# Patient Record
Sex: FEMALE | Race: WHITE | NOT HISPANIC OR LATINO | Employment: OTHER | URBAN - METROPOLITAN AREA
[De-identification: names, ages, dates, MRNs, and addresses within clinical notes are randomized per-mention and may not be internally consistent; named-entity substitution may affect disease eponyms.]

---

## 2017-03-04 DIAGNOSIS — C50.112 MALIGNANT NEOPLASM OF CENTRAL PORTION OF LEFT FEMALE BREAST: ICD-10-CM

## 2017-03-06 RX ORDER — ANASTROZOLE 1 MG/1
TABLET ORAL
Qty: 90 TABLET | Refills: 3 | Status: SHIPPED | OUTPATIENT
Start: 2017-03-06 | End: 2018-03-20 | Stop reason: SDUPTHER

## 2017-03-22 ENCOUNTER — OFFICE VISIT (OUTPATIENT)
Dept: HEMATOLOGY/ONCOLOGY | Facility: CLINIC | Age: 82
End: 2017-03-22
Payer: MEDICARE

## 2017-03-22 VITALS
TEMPERATURE: 98 F | DIASTOLIC BLOOD PRESSURE: 79 MMHG | SYSTOLIC BLOOD PRESSURE: 169 MMHG | BODY MASS INDEX: 23.13 KG/M2 | RESPIRATION RATE: 15 BRPM | HEART RATE: 103 BPM | WEIGHT: 143.31 LBS

## 2017-03-22 DIAGNOSIS — C50.912 CANCER OF LEFT BREAST, STAGE 3: Primary | ICD-10-CM

## 2017-03-22 PROCEDURE — 99999 PR PBB SHADOW E&M-EST. PATIENT-LVL III: CPT | Mod: PBBFAC,,, | Performed by: INTERNAL MEDICINE

## 2017-03-22 PROCEDURE — 1157F ADVNC CARE PLAN IN RCRD: CPT | Mod: S$GLB,,, | Performed by: INTERNAL MEDICINE

## 2017-03-22 PROCEDURE — 3078F DIAST BP <80 MM HG: CPT | Mod: S$GLB,,, | Performed by: INTERNAL MEDICINE

## 2017-03-22 PROCEDURE — 1160F RVW MEDS BY RX/DR IN RCRD: CPT | Mod: S$GLB,,, | Performed by: INTERNAL MEDICINE

## 2017-03-22 PROCEDURE — 1126F AMNT PAIN NOTED NONE PRSNT: CPT | Mod: S$GLB,,, | Performed by: INTERNAL MEDICINE

## 2017-03-22 PROCEDURE — 3077F SYST BP >= 140 MM HG: CPT | Mod: S$GLB,,, | Performed by: INTERNAL MEDICINE

## 2017-03-22 PROCEDURE — 1159F MED LIST DOCD IN RCRD: CPT | Mod: S$GLB,,, | Performed by: INTERNAL MEDICINE

## 2017-03-22 PROCEDURE — 99213 OFFICE O/P EST LOW 20 MIN: CPT | Mod: S$GLB,,, | Performed by: INTERNAL MEDICINE

## 2017-03-22 RX ORDER — ACETAMINOPHEN 500 MG/1
CAPSULE, LIQUID FILLED ORAL
COMMUNITY
Start: 2017-02-07 | End: 2017-03-22 | Stop reason: SDUPTHER

## 2017-03-22 NOTE — PROGRESS NOTES
Subjective:       Patient ID: Lorene Cartwright is a 81 y.o. female.    Chief Complaint: Breast Cancer    HPI Ms. 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  Today she reports no new issues.  Her arthritic symptoms are unchanged.  Appetites been stable.  She does have some chronic constipation.  She has no shortness of breath.      Past history:She had noted thickening of her breast for several months in early 2013 and saw her primary physician who ordered the screening mammography.  This showed a 7 mm area mass density in the peter-areolar region.  Ultrasonography showed a 4 cm area of the hypoechogenicity. Core needle biopsy showed infiltrating lobular carcinoma which was ER positive, [place] negative, and HER-2 negative by FISH.    Physical examination at that time showed a 5 x 4.5 cm mass in the lateral aspect of left breast with palpable left axillary lymph nodes.  MRI on March 13 showed a 6.8 cm mass involving the upper outer quadrant of her quadrant several abnormal lymph nodes in the left axilla.    On August 19, 2013 she underwent left mastectomy and sentinel lymph node biopsy then a axillary lymph node dissection.  Mastectomy showed a pleomorphic infiltrating lobular carcinoma-high grade (3+3+2) which was multicentric, with multiple tumors ranging from  6.4 cm to 0.1  centimeters and involving all 4 quadrants of the breast.  [place] lymph node was positive with extracapsular extension and 22 other axillary lymph nodes also were positive.  Additional extracapsular was extension was noted in that sample. Ostoperatively, she received 4 cycles of AC chemotherapy.  and  Taxol  X  4.  She also received adjuvant radiation which was completed that at the end of March 2014. .Anastrozole therapy was started in January 2014.  Review of Systems   Constitutional: Negative for activity change, appetite change, fever and  unexpected weight change.   Respiratory: Negative for cough and shortness of breath.    Cardiovascular: Negative for chest pain.   Gastrointestinal: Negative for abdominal pain, diarrhea and nausea.   Musculoskeletal: Positive for arthralgias (knees). Negative for back pain.        Occasional right shoulder pain   Neurological: Negative for headaches.        Memory loss   Psychiatric/Behavioral: Negative for dysphoric mood. The patient is not nervous/anxious.        Objective:      Physical Exam   Constitutional: She is oriented to person, place, and time. She appears well-developed and well-nourished. No distress.   Cardiovascular: Normal rate, regular rhythm and normal heart sounds.    Pulmonary/Chest: Effort normal and breath sounds normal. She has no wheezes. She has no rales. Right breast exhibits no mass, no nipple discharge and no skin change.       Abdominal: Soft. She exhibits no mass. There is no tenderness.   Lymphadenopathy:     She has no cervical adenopathy.     She has no axillary adenopathy.        Right: No supraclavicular adenopathy present.        Left: No supraclavicular adenopathy present.   Neurological: She is alert and oriented to person, place, and time.   Psychiatric: She has a normal mood and affect. Her behavior is normal. Thought content normal.       Assessment:      1. Cancer of left breast, stage 3        Plan:     return to clinic in 4 months.  Mammogram at that time.

## 2017-07-03 ENCOUNTER — OFFICE VISIT (OUTPATIENT)
Dept: HEMATOLOGY/ONCOLOGY | Facility: CLINIC | Age: 82
End: 2017-07-03
Payer: MEDICARE

## 2017-07-03 ENCOUNTER — HOSPITAL ENCOUNTER (OUTPATIENT)
Dept: RADIOLOGY | Facility: HOSPITAL | Age: 82
Discharge: HOME | End: 2017-07-03
Attending: INTERNAL MEDICINE
Payer: MEDICARE

## 2017-07-03 VITALS — HEIGHT: 66 IN | BODY MASS INDEX: 22.5 KG/M2 | WEIGHT: 140 LBS

## 2017-07-03 VITALS
HEIGHT: 65 IN | HEART RATE: 72 BPM | DIASTOLIC BLOOD PRESSURE: 71 MMHG | OXYGEN SATURATION: 98 % | TEMPERATURE: 97 F | WEIGHT: 145 LBS | RESPIRATION RATE: 18 BRPM | SYSTOLIC BLOOD PRESSURE: 165 MMHG | BODY MASS INDEX: 24.16 KG/M2

## 2017-07-03 DIAGNOSIS — C50.112 MALIGNANT NEOPLASM OF CENTRAL PORTION OF LEFT FEMALE BREAST: Primary | ICD-10-CM

## 2017-07-03 DIAGNOSIS — C50.912 CANCER OF LEFT BREAST, STAGE 3: ICD-10-CM

## 2017-07-03 DIAGNOSIS — M89.9 DISORDER OF BONE: ICD-10-CM

## 2017-07-03 DIAGNOSIS — Z13.820 OSTEOPOROSIS SCREENING: ICD-10-CM

## 2017-07-03 PROCEDURE — 77061 BREAST TOMOSYNTHESIS UNI: CPT | Mod: 26,,, | Performed by: RADIOLOGY

## 2017-07-03 PROCEDURE — 99213 OFFICE O/P EST LOW 20 MIN: CPT | Mod: S$GLB,,, | Performed by: PHYSICIAN ASSISTANT

## 2017-07-03 PROCEDURE — 99999 PR PBB SHADOW E&M-EST. PATIENT-LVL III: CPT | Mod: PBBFAC,,, | Performed by: PHYSICIAN ASSISTANT

## 2017-07-03 PROCEDURE — 1157F ADVNC CARE PLAN IN RCRD: CPT | Mod: S$GLB,,, | Performed by: PHYSICIAN ASSISTANT

## 2017-07-03 PROCEDURE — 1159F MED LIST DOCD IN RCRD: CPT | Mod: S$GLB,,, | Performed by: PHYSICIAN ASSISTANT

## 2017-07-03 PROCEDURE — 77065 DX MAMMO INCL CAD UNI: CPT | Mod: 26,,, | Performed by: RADIOLOGY

## 2017-07-03 PROCEDURE — 1126F AMNT PAIN NOTED NONE PRSNT: CPT | Mod: S$GLB,,, | Performed by: PHYSICIAN ASSISTANT

## 2017-07-03 NOTE — PROGRESS NOTES
Subjective:       Patient ID: Lorene Cartwright is a 81 y.o. female.    Chief Complaint: No chief complaint on file.    Ms. 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  Today she reports no new issues.  Her arthritic symptoms are unchanged.  Appetite and energy level has been stable.    No breast pain. No shortness of breath. She has recently noticed some intermittent swelling at her right ankle.     Right mammogram from today:  Impression:  No mammographic evidence of malignancy.  BI-RADS Category 1: Negative  Recommendation:  Routine Screening Mammogram in 1 year is recommended for the right breast.        Past history:She had noted thickening of her breast for several months in early 2013 and saw her primary physician who ordered the screening mammography.  This showed a 7 mm area mass density in the peter-areolar region.  Ultrasonography showed a 4 cm area of the hypoechogenicity. Core needle biopsy showed infiltrating lobular carcinoma which was ER positive, [place] negative, and HER-2 negative by FISH.    Physical examination at that time showed a 5 x 4.5 cm mass in the lateral aspect of left breast with palpable left axillary lymph nodes.  MRI on March 13 showed a 6.8 cm mass involving the upper outer quadrant of her quadrant several abnormal lymph nodes in the left axilla.    On August 19, 2013 she underwent left mastectomy and sentinel lymph node biopsy then a axillary lymph node dissection.  Mastectomy showed a pleomorphic infiltrating lobular carcinoma-high grade (3+3+2) which was multicentric, with multiple tumors ranging from  6.4 cm to 0.1  centimeters and involving all 4 quadrants of the breast.  [place] lymph node was positive with extracapsular extension and 22 other axillary lymph nodes also were positive.  Additional extracapsular was extension was noted in that sample. Ostoperatively, she received 4  cycles of AC chemotherapy.  and  Taxol  X  4.  She also received adjuvant radiation which was completed that at the end of March 2014. .Anastrozole therapy was started in January 2014.      Review of Systems   Constitutional: Negative for activity change, appetite change, chills, diaphoresis, fatigue, fever and unexpected weight change.   HENT: Negative for congestion, ear pain, mouth sores, rhinorrhea, sinus pressure, sore throat and trouble swallowing.    Eyes: Negative for visual disturbance.   Respiratory: Negative for cough, chest tightness and shortness of breath.    Cardiovascular: Negative for chest pain and leg swelling.   Gastrointestinal: Negative for abdominal pain, blood in stool, constipation, diarrhea, nausea and vomiting.   Genitourinary: Negative for dysuria and hematuria.   Musculoskeletal: Positive for arthralgias. Negative for back pain, gait problem, joint swelling and myalgias.   Skin: Negative for pallor and rash.   Neurological: Negative for dizziness, weakness, light-headedness and headaches.   Hematological: Negative for adenopathy. Does not bruise/bleed easily.   Psychiatric/Behavioral: Negative for confusion, dysphoric mood, sleep disturbance and suicidal ideas.       Objective:      Physical Exam   Constitutional: She is oriented to person, place, and time. She appears well-developed and well-nourished. No distress.   Presents with daughter, ECOG 0   HENT:   Head: Normocephalic.   Mouth/Throat: No oropharyngeal exudate.   Eyes: Conjunctivae and EOM are normal. Pupils are equal, round, and reactive to light. No scleral icterus.   Neck: Normal range of motion. Neck supple. No thyromegaly present.   Cardiovascular: Normal rate, regular rhythm, normal heart sounds and intact distal pulses.    No murmur heard.  Pulmonary/Chest: Effort normal and breath sounds normal. No respiratory distress. She has no wheezes.   RIght breast without mass or nodules. S/p left mastectomy without mass or  nodules at chest wall. No axillary adenopathy.    Abdominal: Soft. Bowel sounds are normal. She exhibits no distension and no mass. There is no tenderness.   Musculoskeletal: Normal range of motion. She exhibits no edema.   No spinal or paraspinal tenderness to palpation         Lymphadenopathy:     She has no cervical adenopathy.   Neurological: She is alert and oriented to person, place, and time. No cranial nerve deficit.   CN II-XII intact  No focal neurologic deficits     Skin: Skin is warm and dry. No rash noted. No erythema. No pallor.   Psychiatric: She has a normal mood and affect. Her behavior is normal. Judgment and thought content normal.   Vitals reviewed.      Assessment:       1. Malignant neoplasm of central portion of left female breast        Plan:       Patient will continue Letrozole and return to clinic in 4 months.  Referral for bone density placed as she is on Aromatase Inhibitor therapy and patient will remain on Vitamin D/Calcium.

## 2017-08-18 ENCOUNTER — HOSPITAL ENCOUNTER (OUTPATIENT)
Dept: RADIOLOGY | Facility: HOSPITAL | Age: 82
Discharge: HOME | End: 2017-08-18
Attending: NURSE PRACTITIONER
Payer: MEDICARE

## 2017-08-18 ENCOUNTER — HOSPITAL ENCOUNTER (OUTPATIENT)
Dept: RADIOLOGY | Facility: CLINIC | Age: 82
Discharge: HOME | End: 2017-08-18
Attending: INTERNAL MEDICINE
Payer: MEDICARE

## 2017-08-18 DIAGNOSIS — Z13.820 OSTEOPOROSIS SCREENING: ICD-10-CM

## 2017-08-18 DIAGNOSIS — M89.9 DISORDER OF BONE: ICD-10-CM

## 2017-08-18 DIAGNOSIS — M79.672 LEFT FOOT PAIN: ICD-10-CM

## 2017-08-18 PROCEDURE — 73610 X-RAY EXAM OF ANKLE: CPT | Mod: 26,LT,, | Performed by: RADIOLOGY

## 2017-08-18 PROCEDURE — 73090 X-RAY EXAM OF FOREARM: CPT | Mod: 26,LT,, | Performed by: RADIOLOGY

## 2017-08-18 PROCEDURE — 77080 DXA BONE DENSITY AXIAL: CPT | Mod: 26,,, | Performed by: INTERNAL MEDICINE

## 2017-08-18 PROCEDURE — 73610 X-RAY EXAM OF ANKLE: CPT | Mod: TC,LT

## 2017-08-18 PROCEDURE — 77080 DXA BONE DENSITY AXIAL: CPT | Mod: TC

## 2017-08-18 PROCEDURE — 73090 X-RAY EXAM OF FOREARM: CPT | Mod: TC,LT

## 2017-11-06 NOTE — PROGRESS NOTES
Subjective:       Patient ID: Lorene Cartwright is a 82 y.o. female.    Chief Complaint: No chief complaint on file.    HPI Ms. 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  She has been on anastrozole therapy.  Today she reports she's had some occasional pain in her left buttock which is positional.  She has no shortness of breath.  Appetites been good.  Her constipation is controlled after taking some MiraLAX.       Past history:She had noted thickening of her breast for several months in early 2013 and saw her primary physician who ordered the screening mammography.  This showed a 7 mm area mass density in the peter-areolar region.  Ultrasonography showed a 4 cm area of the hypoechogenicity. Core needle biopsy showed infiltrating lobular carcinoma which was ER positive, [place] negative, and HER-2 negative by FISH.      Clinically the tumor was T3 N1.    On August 19, 2013 she underwent left mastectomy and sentinel lymph node biopsy then a axillary lymph node dissection.  Mastectomy showed a pleomorphic infiltrating lobular carcinoma-high grade (3+3+2) which was multicentric, with multiple tumors ranging from  6.4 cm to 0.1  centimeters and involving all 4 quadrants of the breast.  [place] lymph node was positive with extracapsular extension and 22 other axillary lymph nodes also were positive.  Additional extracapsular was extension was noted in that sample. Ostoperatively, she received 4 cycles of AC chemotherapy.  and  Taxol  X  4.      She also received adjuvant radiation which was completed that at the end of March 2014.     Anastrozole therapy was started in January 2014  Review of Systems   Constitutional: Negative for appetite change and unexpected weight change.   Eyes: Negative for visual disturbance.   Respiratory: Negative for cough and shortness of breath.    Cardiovascular: Negative for chest pain.    Gastrointestinal: Negative for abdominal pain and diarrhea.   Genitourinary: Negative for frequency.   Musculoskeletal: Negative for back pain.   Skin: Negative for rash.   Neurological: Negative for headaches.   Hematological: Negative for adenopathy.   Psychiatric/Behavioral: The patient is not nervous/anxious.        Objective:      Physical Exam   Constitutional: She appears well-developed and well-nourished. No distress.   Eyes: No scleral icterus.   Pulmonary/Chest: Effort normal and breath sounds normal. She has no wheezes. She has no rales.   Abdominal: Soft. She exhibits no mass. There is no tenderness.   Lymphadenopathy:     She has no cervical adenopathy.   Psychiatric: She has a normal mood and affect. Her behavior is normal. Thought content normal.   Vitals reviewed.      Assessment:       1. Malignant neoplasm of central portion of right breast in female, estrogen receptor positive        Plan:       RTC 6 Months    And a wheelchair   Distress Screening Results: Psychosocial Distress screening score of Distress Score: 0 noted and reviewed. No intervention indicated.

## 2017-11-07 ENCOUNTER — OFFICE VISIT (OUTPATIENT)
Dept: HEMATOLOGY/ONCOLOGY | Facility: CLINIC | Age: 82
End: 2017-11-07
Payer: MEDICARE

## 2017-11-07 VITALS
TEMPERATURE: 98 F | DIASTOLIC BLOOD PRESSURE: 77 MMHG | OXYGEN SATURATION: 94 % | WEIGHT: 145.75 LBS | RESPIRATION RATE: 18 BRPM | BODY MASS INDEX: 24.25 KG/M2 | SYSTOLIC BLOOD PRESSURE: 151 MMHG

## 2017-11-07 DIAGNOSIS — Z17.0 MALIGNANT NEOPLASM OF CENTRAL PORTION OF RIGHT BREAST IN FEMALE, ESTROGEN RECEPTOR POSITIVE: Primary | ICD-10-CM

## 2017-11-07 DIAGNOSIS — C50.111 MALIGNANT NEOPLASM OF CENTRAL PORTION OF RIGHT BREAST IN FEMALE, ESTROGEN RECEPTOR POSITIVE: Primary | ICD-10-CM

## 2017-11-07 PROCEDURE — 99213 OFFICE O/P EST LOW 20 MIN: CPT | Mod: S$GLB,,, | Performed by: INTERNAL MEDICINE

## 2017-11-07 PROCEDURE — 99999 PR PBB SHADOW E&M-EST. PATIENT-LVL III: CPT | Mod: PBBFAC,,, | Performed by: INTERNAL MEDICINE

## 2018-03-20 DIAGNOSIS — C50.919 MALIGNANT NEOPLASM OF BREAST, STAGE 2, UNSPECIFIED LATERALITY: Primary | ICD-10-CM

## 2018-03-20 RX ORDER — ANASTROZOLE 1 MG/1
1 TABLET ORAL DAILY
Qty: 90 TABLET | Refills: 3 | Status: SHIPPED | OUTPATIENT
Start: 2018-03-20

## 2018-05-14 NOTE — PROGRESS NOTES
Subjective:       Patient ID: Lorene Cartwright is a 82 y.o. female.    Chief Complaint: No chief complaint on file.    HPI   Ms. 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  She has been on anastrozole therapy.    She has no breast complaints.  Appetites generally been good and her weight is been stable.  She's having no unusual pain other than her feet are bothering her.       Past history:She had noted thickening of her breast for several months in early 2013 and saw her primary physician who ordered the screening mammography.  This showed a 7 mm area mass density in the peter-areolar region.  Ultrasonography showed a 4 cm area of the hypoechogenicity. Core needle biopsy showed infiltrating lobular carcinoma which was ER positive, [place] negative, and HER-2 negative by FISH.      Clinically the tumor was T3 N1.    On August 19, 2013 she underwent left mastectomy and sentinel lymph node biopsy then a axillary lymph node dissection.  Mastectomy showed a pleomorphic infiltrating lobular carcinoma-high grade (3+3+2) which was multicentric, with multiple tumors ranging from  6.4 cm to 0.1  centimeters and involving all 4 quadrants of the breast.  [place] lymph node was positive with extracapsular extension and 22 other axillary lymph nodes also were positive.  Additional extracapsular was extension was noted in that sample. Ostoperatively, she received 4 cycles of AC chemotherapy.  and  Taxol  X  4.      She also received adjuvant radiation which was completed that at the end of March 2014.     Anastrozole therapy was started in January 2014  Review of Systems   Constitutional: Negative for appetite change and unexpected weight change.   Eyes: Negative for visual disturbance.   Respiratory: Negative for cough and shortness of breath.    Cardiovascular: Negative for chest pain.   Gastrointestinal: Negative for abdominal  pain and diarrhea.   Genitourinary: Negative for frequency.   Musculoskeletal: Negative for back pain.        Foot pain   Skin: Negative for rash.   Neurological: Negative for headaches.   Hematological: Negative for adenopathy.   Psychiatric/Behavioral: The patient is not nervous/anxious.        Objective:      Physical Exam   Constitutional: She appears well-developed and well-nourished. No distress.   Eyes: No scleral icterus.   Pulmonary/Chest: Effort normal and breath sounds normal. She has no wheezes. She has no rales. Right breast exhibits no mass, no nipple discharge and no skin change.       Abdominal: Soft. She exhibits no mass. There is no tenderness.   Lymphadenopathy:     She has no cervical adenopathy.   Psychiatric: She has a normal mood and affect. Her behavior is normal. Thought content normal.   Vitals reviewed.      Assessment:       1. Cancer of left breast, stage 3        Plan:     Due for mammogram in July.  RTC 6 Months    Distress Screening Results: Psychosocial Distress screening score of Distress Score: 0 noted and reviewed. No intervention indicated.

## 2018-05-15 ENCOUNTER — OFFICE VISIT (OUTPATIENT)
Dept: HEMATOLOGY/ONCOLOGY | Facility: CLINIC | Age: 83
End: 2018-05-15
Payer: MEDICARE

## 2018-05-15 VITALS
BODY MASS INDEX: 23.32 KG/M2 | TEMPERATURE: 98 F | DIASTOLIC BLOOD PRESSURE: 73 MMHG | HEART RATE: 81 BPM | HEIGHT: 65 IN | SYSTOLIC BLOOD PRESSURE: 164 MMHG | WEIGHT: 140 LBS | RESPIRATION RATE: 20 BRPM

## 2018-05-15 DIAGNOSIS — C50.912 CANCER OF LEFT BREAST, STAGE 3: Primary | ICD-10-CM

## 2018-05-15 PROCEDURE — 99999 PR PBB SHADOW E&M-EST. PATIENT-LVL III: CPT | Mod: PBBFAC,,, | Performed by: INTERNAL MEDICINE

## 2018-05-15 PROCEDURE — 3077F SYST BP >= 140 MM HG: CPT | Mod: S$GLB,,, | Performed by: INTERNAL MEDICINE

## 2018-05-15 PROCEDURE — 3078F DIAST BP <80 MM HG: CPT | Mod: S$GLB,,, | Performed by: INTERNAL MEDICINE

## 2018-05-15 PROCEDURE — 99213 OFFICE O/P EST LOW 20 MIN: CPT | Mod: S$GLB,,, | Performed by: INTERNAL MEDICINE

## 2018-07-15 ENCOUNTER — PATIENT MESSAGE (OUTPATIENT)
Dept: HEMATOLOGY/ONCOLOGY | Facility: CLINIC | Age: 83
End: 2018-07-15

## 2018-07-16 DIAGNOSIS — C50.912 CANCER OF LEFT BREAST, STAGE 3: Primary | ICD-10-CM

## 2018-08-03 ENCOUNTER — HOSPITAL ENCOUNTER (OUTPATIENT)
Dept: RADIOLOGY | Facility: HOSPITAL | Age: 83
Discharge: HOME | End: 2018-08-03
Attending: INTERNAL MEDICINE
Payer: MEDICARE

## 2018-08-03 DIAGNOSIS — C50.912 CANCER OF LEFT BREAST, STAGE 3: ICD-10-CM

## 2018-08-03 PROCEDURE — 77065 DX MAMMO INCL CAD UNI: CPT | Mod: 26,RT,, | Performed by: RADIOLOGY

## 2018-08-03 PROCEDURE — 77065 DX MAMMO INCL CAD UNI: CPT | Mod: TC,PO,RT

## 2018-09-20 PROBLEM — I10 ESSENTIAL HYPERTENSION: Status: ACTIVE | Noted: 2018-09-20

## 2018-09-20 PROBLEM — N17.9 ACUTE RENAL FAILURE: Status: ACTIVE | Noted: 2018-09-20

## 2018-09-20 PROBLEM — R06.02 SHORTNESS OF BREATH: Status: ACTIVE | Noted: 2018-09-20

## 2018-09-20 PROBLEM — F03.90 DEMENTIA WITHOUT BEHAVIORAL DISTURBANCE: Status: ACTIVE | Noted: 2018-09-20

## 2018-09-21 PROBLEM — N13.30 HYDRONEPHROSIS: Status: ACTIVE | Noted: 2018-09-21

## 2018-09-25 PROBLEM — D62 ACUTE BLOOD LOSS ANEMIA: Status: ACTIVE | Noted: 2018-09-25

## 2018-09-25 PROBLEM — R54 FRAILTY SYNDROME IN GERIATRIC PATIENT: Status: ACTIVE | Noted: 2018-09-25

## 2018-10-23 ENCOUNTER — TELEPHONE (OUTPATIENT)
Dept: ADMINISTRATIVE | Facility: CLINIC | Age: 83
End: 2018-10-23

## 2018-11-13 ENCOUNTER — PATIENT MESSAGE (OUTPATIENT)
Dept: HEMATOLOGY/ONCOLOGY | Facility: CLINIC | Age: 83
End: 2018-11-13

## 2018-11-25 ENCOUNTER — PATIENT MESSAGE (OUTPATIENT)
Dept: HEMATOLOGY/ONCOLOGY | Facility: CLINIC | Age: 83
End: 2018-11-25

## 2019-01-13 ENCOUNTER — PATIENT MESSAGE (OUTPATIENT)
Dept: HEMATOLOGY/ONCOLOGY | Facility: CLINIC | Age: 84
End: 2019-01-13